# Patient Record
(demographics unavailable — no encounter records)

---

## 2025-05-10 NOTE — PROCEDURE
[FreeTextEntry1] : Aseptic debridement of IPK sub right medial midfoot to the base with verruca freeze and salinocaine and a dispersive pad applied.

## 2025-05-10 NOTE — HISTORY OF PRESENT ILLNESS
[FreeTextEntry1] :  Subjective:   - Summary: Patient presents with bilateral foot pain and an intractable plantar keratosis (IPK) on the right foot.   - Chief Complaint (CC): Bilateral foot pain and right foot IPK   - History of Present Illness (HPI): Patient reports generalized bilateral foot pain. There is a specific concern regarding an intractable plantar keratosis (IPK) on the right foot.    -  - Objective:   - Diagnostic Results:    - Vital Signs:  B/L; DP: 1/4 B/L; PT: 1/4 B/L; CR: 2 seconds times ten.   - Physical Examination (PE): Temperature gradient is within normal limits. A 3x5 cm IPK is present in the middle aspect of the right plantar arch, with pain on palpation. Neurovascular status is intact bilaterally. Skin is well-hydrated with good turgor bilaterally.  There is generalized bilateral foot pain.   Assessment:   - Summary: The patient presents with bilateral foot pain and a significant intractable plantar keratosis (IPK) on the right foot, causing localized pain.   - Problems:     - Bilateral foot pain     - Intractable plantar keratosis (IPK) on right foot     - Right foot pain associated with IPK      Plan:   - Summary: The treatment plan focuses on addressing the IPK on the right foot through mechanical debridement and protective padding.   - Plan:     - Perform mechanical debridement of IPK on right midfoot to the base and application of verruca freeze, salinocaine dispersive pad, and dry sterile dressing.          - Instruct patient to return to office as needed (PRN)     - Continue monitoring bilateral foot pain